# Patient Record
Sex: MALE | Race: WHITE | NOT HISPANIC OR LATINO | Employment: OTHER | URBAN - METROPOLITAN AREA
[De-identification: names, ages, dates, MRNs, and addresses within clinical notes are randomized per-mention and may not be internally consistent; named-entity substitution may affect disease eponyms.]

---

## 2018-01-03 ENCOUNTER — RECORDS - HEALTHEAST (OUTPATIENT)
Dept: LAB | Facility: CLINIC | Age: 78
End: 2018-01-03

## 2018-01-03 LAB
ANION GAP SERPL CALCULATED.3IONS-SCNC: 19 MMOL/L (ref 5–18)
BUN SERPL-MCNC: 15 MG/DL (ref 8–28)
CALCIUM SERPL-MCNC: 9.9 MG/DL (ref 8.5–10.5)
CHLORIDE BLD-SCNC: 104 MMOL/L (ref 98–107)
CHOLEST SERPL-MCNC: 243 MG/DL
CO2 SERPL-SCNC: 17 MMOL/L (ref 22–31)
CREAT SERPL-MCNC: 1.2 MG/DL (ref 0.7–1.3)
FASTING STATUS PATIENT QL REPORTED: ABNORMAL
GFR SERPL CREATININE-BSD FRML MDRD: 58 ML/MIN/1.73M2
GLUCOSE BLD-MCNC: 98 MG/DL (ref 70–125)
HDLC SERPL-MCNC: 47 MG/DL
LDLC SERPL CALC-MCNC: 162 MG/DL
POTASSIUM BLD-SCNC: ABNORMAL MMOL/L (ref 3.5–5)
SODIUM SERPL-SCNC: 140 MMOL/L (ref 136–145)
TRIGL SERPL-MCNC: 169 MG/DL

## 2018-01-22 ENCOUNTER — RECORDS - HEALTHEAST (OUTPATIENT)
Dept: LAB | Facility: CLINIC | Age: 78
End: 2018-01-22

## 2018-01-22 LAB
ERYTHROCYTE [SEDIMENTATION RATE] IN BLOOD BY WESTERGREN METHOD: 8 MM/HR (ref 0–15)
VIT B12 SERPL-MCNC: 388 PG/ML (ref 213–816)

## 2018-01-25 ENCOUNTER — RECORDS - HEALTHEAST (OUTPATIENT)
Dept: ADMINISTRATIVE | Facility: OTHER | Age: 78
End: 2018-01-25

## 2018-01-25 ENCOUNTER — TRANSFERRED RECORDS (OUTPATIENT)
Dept: HEALTH INFORMATION MANAGEMENT | Facility: CLINIC | Age: 78
End: 2018-01-25

## 2018-02-14 ENCOUNTER — TRANSFERRED RECORDS (OUTPATIENT)
Dept: HEALTH INFORMATION MANAGEMENT | Facility: CLINIC | Age: 78
End: 2018-02-14

## 2018-02-14 ENCOUNTER — RECORDS - HEALTHEAST (OUTPATIENT)
Dept: ADMINISTRATIVE | Facility: OTHER | Age: 78
End: 2018-02-14

## 2018-09-20 ENCOUNTER — RECORDS - HEALTHEAST (OUTPATIENT)
Dept: LAB | Facility: CLINIC | Age: 78
End: 2018-09-20

## 2018-09-20 LAB
ANION GAP SERPL CALCULATED.3IONS-SCNC: 10 MMOL/L (ref 5–18)
BUN SERPL-MCNC: 9 MG/DL (ref 8–28)
CALCIUM SERPL-MCNC: 10.2 MG/DL (ref 8.5–10.5)
CHLORIDE BLD-SCNC: 100 MMOL/L (ref 98–107)
CO2 SERPL-SCNC: 28 MMOL/L (ref 22–31)
CREAT SERPL-MCNC: 1.06 MG/DL (ref 0.7–1.3)
GFR SERPL CREATININE-BSD FRML MDRD: >60 ML/MIN/1.73M2
GLUCOSE BLD-MCNC: 107 MG/DL (ref 70–125)
POTASSIUM BLD-SCNC: 4.1 MMOL/L (ref 3.5–5)
SODIUM SERPL-SCNC: 138 MMOL/L (ref 136–145)
VIT B12 SERPL-MCNC: 369 PG/ML (ref 213–816)

## 2018-09-21 ENCOUNTER — RECORDS - HEALTHEAST (OUTPATIENT)
Dept: LAB | Facility: CLINIC | Age: 78
End: 2018-09-21

## 2018-09-21 LAB — TSH SERPL DL<=0.005 MIU/L-ACNC: 1.7 UIU/ML (ref 0.3–5)

## 2018-09-24 LAB — B BURGDOR IGG+IGM SER QL: 0.05 INDEX VALUE

## 2018-10-03 ENCOUNTER — RECORDS - HEALTHEAST (OUTPATIENT)
Dept: ADMINISTRATIVE | Facility: OTHER | Age: 78
End: 2018-10-03

## 2018-10-03 ENCOUNTER — TRANSFERRED RECORDS (OUTPATIENT)
Dept: HEALTH INFORMATION MANAGEMENT | Facility: CLINIC | Age: 78
End: 2018-10-03

## 2018-10-30 LAB — TSH SERPL-ACNC: 1.83 MIU/L (ref 0.4–4.5)

## 2018-11-14 ENCOUNTER — TRANSFERRED RECORDS (OUTPATIENT)
Dept: HEALTH INFORMATION MANAGEMENT | Facility: CLINIC | Age: 78
End: 2018-11-14

## 2018-11-14 ENCOUNTER — RECORDS - HEALTHEAST (OUTPATIENT)
Dept: ADMINISTRATIVE | Facility: OTHER | Age: 78
End: 2018-11-14

## 2019-04-17 ENCOUNTER — RECORDS - HEALTHEAST (OUTPATIENT)
Dept: LAB | Facility: CLINIC | Age: 79
End: 2019-04-17

## 2019-04-17 LAB
ANION GAP SERPL CALCULATED.3IONS-SCNC: 10 MMOL/L (ref 5–18)
BUN SERPL-MCNC: 19 MG/DL (ref 8–28)
CALCIUM SERPL-MCNC: 9.6 MG/DL (ref 8.5–10.5)
CHLORIDE BLD-SCNC: 103 MMOL/L (ref 98–107)
CO2 SERPL-SCNC: 29 MMOL/L (ref 22–31)
CREAT SERPL-MCNC: 1.01 MG/DL (ref 0.7–1.3)
GFR SERPL CREATININE-BSD FRML MDRD: >60 ML/MIN/1.73M2
GLUCOSE BLD-MCNC: 114 MG/DL (ref 70–125)
POTASSIUM BLD-SCNC: 3.9 MMOL/L (ref 3.5–5)
SODIUM SERPL-SCNC: 142 MMOL/L (ref 136–145)

## 2019-07-16 ENCOUNTER — RECORDS - HEALTHEAST (OUTPATIENT)
Dept: LAB | Facility: CLINIC | Age: 79
End: 2019-07-16

## 2019-07-16 LAB
ALBUMIN SERPL-MCNC: 3.8 G/DL (ref 3.5–5)
ALP SERPL-CCNC: 65 U/L (ref 45–120)
ALT SERPL W P-5'-P-CCNC: 16 U/L (ref 0–45)
ANION GAP SERPL CALCULATED.3IONS-SCNC: 14 MMOL/L (ref 5–18)
AST SERPL W P-5'-P-CCNC: 18 U/L (ref 0–40)
BILIRUB SERPL-MCNC: 0.9 MG/DL (ref 0–1)
BUN SERPL-MCNC: 15 MG/DL (ref 8–28)
CALCIUM SERPL-MCNC: 9.7 MG/DL (ref 8.5–10.5)
CHLORIDE BLD-SCNC: 101 MMOL/L (ref 98–107)
CO2 SERPL-SCNC: 25 MMOL/L (ref 22–31)
CREAT SERPL-MCNC: 1.04 MG/DL (ref 0.7–1.3)
ERYTHROCYTE [SEDIMENTATION RATE] IN BLOOD BY WESTERGREN METHOD: 8 MM/HR (ref 0–15)
GFR SERPL CREATININE-BSD FRML MDRD: >60 ML/MIN/1.73M2
GLUCOSE BLD-MCNC: 103 MG/DL (ref 70–125)
POTASSIUM BLD-SCNC: 3.7 MMOL/L (ref 3.5–5)
PROT SERPL-MCNC: 6.6 G/DL (ref 6–8)
PSA SERPL-MCNC: 1.9 NG/ML (ref 0–6.5)
SODIUM SERPL-SCNC: 140 MMOL/L (ref 136–145)

## 2019-07-17 LAB
ALBUMIN PERCENT: 59.7 % (ref 51–67)
ALBUMIN SERPL ELPH-MCNC: 3.8 G/DL (ref 3.2–4.7)
ALPHA 1 PERCENT: 3.1 % (ref 2–4)
ALPHA 2 PERCENT: 10.9 % (ref 5–13)
ALPHA1 GLOB SERPL ELPH-MCNC: 0.2 G/DL (ref 0.1–0.3)
ALPHA2 GLOB SERPL ELPH-MCNC: 0.7 G/DL (ref 0.4–0.9)
B-GLOBULIN SERPL ELPH-MCNC: 0.8 G/DL (ref 0.7–1.2)
BETA PERCENT: 12.7 % (ref 10–17)
GAMMA GLOB SERPL ELPH-MCNC: 0.9 G/DL (ref 0.6–1.4)
GAMMA GLOBULIN PERCENT: 13.6 % (ref 9–20)
PATH ICD:: NORMAL
PROT PATTERN SERPL ELPH-IMP: NORMAL
PROT SERPL-MCNC: 6.4 G/DL (ref 6–8)
REVIEWING PATHOLOGIST: NORMAL

## 2019-07-18 LAB
PATH ICD:: NORMAL
PROT PATTERN SERPL ELPH-IMP: NORMAL
REVIEWING PATHOLOGIST: NORMAL
TOTAL PROTEIN RANDOM URINE MG/DL: 12 MG/DL

## 2019-08-01 ENCOUNTER — RECORDS - HEALTHEAST (OUTPATIENT)
Dept: LAB | Facility: CLINIC | Age: 79
End: 2019-08-01

## 2019-08-01 LAB — TSH SERPL DL<=0.005 MIU/L-ACNC: 1.4 UIU/ML (ref 0.3–5)

## 2019-08-28 ENCOUNTER — RECORDS - HEALTHEAST (OUTPATIENT)
Dept: ADMINISTRATIVE | Facility: OTHER | Age: 79
End: 2019-08-28

## 2019-08-28 ENCOUNTER — AMBULATORY - HEALTHEAST (OUTPATIENT)
Dept: NEUROSURGERY | Facility: CLINIC | Age: 79
End: 2019-08-28

## 2019-08-28 ENCOUNTER — RECORDS - HEALTHEAST (OUTPATIENT)
Dept: RADIOLOGY | Facility: CLINIC | Age: 79
End: 2019-08-28

## 2019-09-03 ENCOUNTER — OFFICE VISIT - HEALTHEAST (OUTPATIENT)
Dept: NEUROSURGERY | Facility: CLINIC | Age: 79
End: 2019-09-03

## 2019-09-03 DIAGNOSIS — M54.16 LUMBAR RADICULOPATHY: ICD-10-CM

## 2019-09-03 DIAGNOSIS — G89.29 CHRONIC RIGHT SHOULDER PAIN: ICD-10-CM

## 2019-09-03 DIAGNOSIS — M25.511 CHRONIC RIGHT SHOULDER PAIN: ICD-10-CM

## 2019-09-10 ENCOUNTER — RECORDS - HEALTHEAST (OUTPATIENT)
Dept: ADMINISTRATIVE | Facility: OTHER | Age: 79
End: 2019-09-10

## 2019-09-12 ENCOUNTER — COMMUNICATION - HEALTHEAST (OUTPATIENT)
Dept: NEUROSURGERY | Facility: CLINIC | Age: 79
End: 2019-09-12

## 2019-09-12 ENCOUNTER — AMBULATORY - HEALTHEAST (OUTPATIENT)
Dept: NEUROSURGERY | Facility: CLINIC | Age: 79
End: 2019-09-12

## 2019-09-12 DIAGNOSIS — Z01.818 PRE-OP EXAM: ICD-10-CM

## 2019-09-19 ASSESSMENT — MIFFLIN-ST. JEOR: SCORE: 1819.83

## 2019-09-20 ENCOUNTER — COMMUNICATION - HEALTHEAST (OUTPATIENT)
Dept: NEUROSURGERY | Facility: CLINIC | Age: 79
End: 2019-09-20

## 2019-09-23 ENCOUNTER — SURGERY - HEALTHEAST (OUTPATIENT)
Dept: SURGERY | Facility: HOSPITAL | Age: 79
End: 2019-09-23

## 2019-09-23 ENCOUNTER — AMBULATORY - HEALTHEAST (OUTPATIENT)
Dept: NEUROSURGERY | Facility: CLINIC | Age: 79
End: 2019-09-23

## 2019-09-23 ENCOUNTER — ANESTHESIA - HEALTHEAST (OUTPATIENT)
Dept: SURGERY | Facility: HOSPITAL | Age: 79
End: 2019-09-23

## 2019-09-24 ENCOUNTER — COMMUNICATION - HEALTHEAST (OUTPATIENT)
Dept: NEUROSURGERY | Facility: CLINIC | Age: 79
End: 2019-09-24

## 2021-01-01 ENCOUNTER — HOSPITAL ENCOUNTER (EMERGENCY)
Facility: HOSPITAL | Age: 81
Discharge: HOME | End: 2021-12-16
Attending: EMERGENCY MEDICINE | Admitting: EMERGENCY MEDICINE
Payer: MEDICARE

## 2021-01-01 ENCOUNTER — TELEPHONE (OUTPATIENT)
Dept: NEUROLOGY | Facility: CLINIC | Age: 81
End: 2021-01-01

## 2021-01-01 ENCOUNTER — HEALTH MAINTENANCE LETTER (OUTPATIENT)
Age: 81
End: 2021-01-01

## 2021-01-01 ENCOUNTER — VIRTUAL VISIT (OUTPATIENT)
Dept: PHARMACY | Facility: CLINIC | Age: 81
End: 2021-01-01
Payer: COMMERCIAL

## 2021-01-01 VITALS
HEIGHT: 73 IN | DIASTOLIC BLOOD PRESSURE: 79 MMHG | RESPIRATION RATE: 14 BRPM | WEIGHT: 190 LBS | BODY MASS INDEX: 25.18 KG/M2 | SYSTOLIC BLOOD PRESSURE: 157 MMHG | HEART RATE: 78 BPM | OXYGEN SATURATION: 95 % | TEMPERATURE: 98.3 F

## 2021-01-01 DIAGNOSIS — R33.9 URINARY RETENTION: ICD-10-CM

## 2021-01-01 DIAGNOSIS — G20.A1 PARKINSON DISEASE (H): Primary | ICD-10-CM

## 2021-01-01 DIAGNOSIS — N40.1 LOWER URINARY TRACT SYMPTOMS DUE TO BENIGN PROSTATIC HYPERPLASIA: ICD-10-CM

## 2021-01-01 DIAGNOSIS — I95.1 ORTHOSTATIC HYPOTENSION: ICD-10-CM

## 2021-01-01 LAB
ALBUMIN UR-MCNC: NEGATIVE MG/DL
ANION GAP SERPL CALCULATED.3IONS-SCNC: 10 MMOL/L (ref 5–18)
APPEARANCE UR: CLEAR
BILIRUB UR QL STRIP: NEGATIVE
BUN SERPL-MCNC: 16 MG/DL (ref 8–28)
CALCIUM SERPL-MCNC: 9.2 MG/DL (ref 8.5–10.5)
CHLORIDE BLD-SCNC: 104 MMOL/L (ref 98–107)
CO2 SERPL-SCNC: 27 MMOL/L (ref 22–31)
COLOR UR AUTO: ABNORMAL
CREAT SERPL-MCNC: 1.07 MG/DL (ref 0.7–1.3)
ERYTHROCYTE [DISTWIDTH] IN BLOOD BY AUTOMATED COUNT: 12.3 % (ref 10–15)
GFR SERPL CREATININE-BSD FRML MDRD: 64 ML/MIN/1.73M2
GLUCOSE BLD-MCNC: 117 MG/DL (ref 70–125)
GLUCOSE UR STRIP-MCNC: NEGATIVE MG/DL
HCT VFR BLD AUTO: 38.2 % (ref 40–53)
HGB BLD-MCNC: 12.3 G/DL (ref 13.3–17.7)
HGB UR QL STRIP: ABNORMAL
KETONES UR STRIP-MCNC: NEGATIVE MG/DL
LEUKOCYTE ESTERASE UR QL STRIP: NEGATIVE
MCH RBC QN AUTO: 30.7 PG (ref 26.5–33)
MCHC RBC AUTO-ENTMCNC: 32.2 G/DL (ref 31.5–36.5)
MCV RBC AUTO: 95 FL (ref 78–100)
NITRATE UR QL: NEGATIVE
PH UR STRIP: 6.5 [PH] (ref 5–7)
PLATELET # BLD AUTO: 206 10E3/UL (ref 150–450)
POTASSIUM BLD-SCNC: 3.6 MMOL/L (ref 3.5–5)
RBC # BLD AUTO: 4.01 10E6/UL (ref 4.4–5.9)
RBC URINE: 3 /HPF
SODIUM SERPL-SCNC: 141 MMOL/L (ref 136–145)
SP GR UR STRIP: 1.01 (ref 1–1.03)
UROBILINOGEN UR STRIP-MCNC: <2 MG/DL
WBC # BLD AUTO: 19.3 10E3/UL (ref 4–11)
WBC URINE: 1 /HPF

## 2021-01-01 PROCEDURE — 36415 COLL VENOUS BLD VENIPUNCTURE: CPT | Performed by: EMERGENCY MEDICINE

## 2021-01-01 PROCEDURE — 81001 URINALYSIS AUTO W/SCOPE: CPT | Performed by: EMERGENCY MEDICINE

## 2021-01-01 PROCEDURE — 51702 INSERT TEMP BLADDER CATH: CPT

## 2021-01-01 PROCEDURE — 51798 US URINE CAPACITY MEASURE: CPT

## 2021-01-01 PROCEDURE — 80048 BASIC METABOLIC PNL TOTAL CA: CPT | Performed by: EMERGENCY MEDICINE

## 2021-01-01 PROCEDURE — 99606 MTMS BY PHARM EST 15 MIN: CPT | Performed by: PHARMACIST

## 2021-01-01 PROCEDURE — 85027 COMPLETE CBC AUTOMATED: CPT | Performed by: EMERGENCY MEDICINE

## 2021-01-01 PROCEDURE — 99284 EMERGENCY DEPT VISIT MOD MDM: CPT | Mod: 25

## 2021-01-01 ASSESSMENT — ACTIVITIES OF DAILY LIVING (ADL)
DEPENDENT_IADLS:: CLEANING;COOKING;LAUNDRY;SHOPPING;MEAL PREPARATION;MEDICATION MANAGEMENT;MONEY MANAGEMENT;TRANSPORTATION;INCONTINENCE

## 2021-01-01 ASSESSMENT — MIFFLIN-ST. JEOR: SCORE: 1610.71

## 2021-02-19 ENCOUNTER — IMMUNIZATION (OUTPATIENT)
Dept: NURSING | Facility: CLINIC | Age: 81
End: 2021-02-19
Payer: MEDICARE

## 2021-02-19 PROCEDURE — 0001A PR COVID VAC PFIZER DIL RECON 30 MCG/0.3 ML IM: CPT

## 2021-02-19 PROCEDURE — 91300 PR COVID VAC PFIZER DIL RECON 30 MCG/0.3 ML IM: CPT

## 2021-03-12 ENCOUNTER — IMMUNIZATION (OUTPATIENT)
Dept: NURSING | Facility: CLINIC | Age: 81
End: 2021-03-12
Attending: INTERNAL MEDICINE
Payer: MEDICARE

## 2021-03-12 PROCEDURE — 91300 PR COVID VAC PFIZER DIL RECON 30 MCG/0.3 ML IM: CPT

## 2021-03-12 PROCEDURE — 0002A PR COVID VAC PFIZER DIL RECON 30 MCG/0.3 ML IM: CPT

## 2021-04-03 ENCOUNTER — HEALTH MAINTENANCE LETTER (OUTPATIENT)
Age: 81
End: 2021-04-03

## 2021-04-26 ENCOUNTER — TRANSFERRED RECORDS (OUTPATIENT)
Dept: HEALTH INFORMATION MANAGEMENT | Facility: CLINIC | Age: 81
End: 2021-04-26

## 2021-04-26 ENCOUNTER — RECORDS - HEALTHEAST (OUTPATIENT)
Dept: LAB | Facility: CLINIC | Age: 81
End: 2021-04-26

## 2021-04-26 LAB
ANION GAP SERPL CALCULATED.3IONS-SCNC: 12 MMOL/L (ref 5–18)
BUN SERPL-MCNC: 16 MG/DL (ref 8–28)
CALCIUM SERPL-MCNC: 9.1 MG/DL (ref 8.5–10.5)
CHLORIDE BLD-SCNC: 98 MMOL/L (ref 98–107)
CHOLEST SERPL-MCNC: 136 MG/DL
CO2 SERPL-SCNC: 26 MMOL/L (ref 22–31)
CREAT SERPL-MCNC: 1.19 MG/DL (ref 0.7–1.3)
FASTING STATUS PATIENT QL REPORTED: ABNORMAL
FOLATE SERPL-MCNC: 17.6 NG/ML
GFR SERPL CREATININE-BSD FRML MDRD: 59 ML/MIN/1.73M2
GLUCOSE BLD-MCNC: 97 MG/DL (ref 70–125)
HDLC SERPL-MCNC: 36 MG/DL
LDLC SERPL CALC-MCNC: 81 MG/DL
MAGNESIUM SERPL-MCNC: 1.7 MG/DL (ref 1.8–2.6)
POTASSIUM BLD-SCNC: 4.1 MMOL/L (ref 3.5–5)
SODIUM SERPL-SCNC: 136 MMOL/L (ref 136–145)
TRIGL SERPL-MCNC: 95 MG/DL
VIT B12 SERPL-MCNC: >2000 PG/ML (ref 213–816)

## 2021-04-28 ENCOUNTER — MEDICAL CORRESPONDENCE (OUTPATIENT)
Dept: HEALTH INFORMATION MANAGEMENT | Facility: CLINIC | Age: 81
End: 2021-04-28

## 2021-04-29 ENCOUNTER — TRANSCRIBE ORDERS (OUTPATIENT)
Dept: OTHER | Age: 81
End: 2021-04-29

## 2021-04-29 DIAGNOSIS — G25.9 MOVEMENT DISORDER: Primary | ICD-10-CM

## 2021-04-30 LAB — VIT B1 PYROPHOSHATE BLD-SCNC: 145 NMOL/L (ref 70–180)

## 2021-05-04 NOTE — TELEPHONE ENCOUNTER
RECORDS RECEIVED FROM: External   Date of Appt: 6/10/21   NOTES (FOR ALL VISITS) STATUS DETAILS   OFFICE NOTE from referring provider Received Dr Miguel Varner @ [facility]:  4/26/21 7/16/19   OFFICE NOTE from other specialist Received Dr Amelia Leal @ [facility]:  10/3/18   DISCHARGE SUMMARY from hospital N/A    DISCHARGE REPORT from the ER N/A    OPERATIVE REPORT N/A    MEDICATION LIST Received    IMAGING  (FOR ALL VISITS)     EMG Received [facility]:  11/14/18    [facility]:  1/25/18   EEG N/A    LUMBAR PUNCTURE N/A    SHRUTHI SCAN N/A    ULTRASOUND (CAROTID BILAT) *VASCULAR* N/A    MRI (HEAD, NECK, SPINE) Received [facility]:  MRI Brain 2/13/18   CT (HEAD, NECK, SPINE) N/A       Action 5/4/21 MV 8.24am   Action Taken Records and imaging request faxed to [facility]     Action 5/5/21 MV 8.14am   Action Taken Records received from [facility] via fax. Sent to scanning.    Imaging request faxed to [facility]:  MRI Brain 2/13/18     Action 5/5/21 MV 8.44am   Action Taken Imaging report received from [facility] and sent to scanning. Images resolved in PACS

## 2021-05-26 VITALS — OXYGEN SATURATION: 94 % | HEART RATE: 63 BPM | SYSTOLIC BLOOD PRESSURE: 189 MMHG | DIASTOLIC BLOOD PRESSURE: 94 MMHG

## 2021-05-28 PROBLEM — E55.9 VITAMIN D DEFICIENCY: Status: ACTIVE | Noted: 2021-05-28

## 2021-05-28 PROBLEM — N40.1 LOWER URINARY TRACT SYMPTOMS DUE TO BENIGN PROSTATIC HYPERPLASIA: Status: ACTIVE | Noted: 2021-05-28

## 2021-05-28 PROBLEM — M53.2X7 INSTABILITY OF LUMBOSACRAL JOINT: Status: ACTIVE | Noted: 2021-05-28

## 2021-05-28 PROBLEM — G56.02 CARPAL TUNNEL SYNDROME OF LEFT WRIST: Status: ACTIVE | Noted: 2021-05-28

## 2021-05-28 PROBLEM — G57.93 NEUROPATHY INVOLVING BOTH LOWER EXTREMITIES: Status: ACTIVE | Noted: 2021-05-28

## 2021-05-28 PROBLEM — G62.9 SENSORY NEUROPATHY: Status: ACTIVE | Noted: 2021-05-28

## 2021-05-28 PROBLEM — M54.30 SCIATICA: Status: ACTIVE | Noted: 2021-05-28

## 2021-05-28 PROBLEM — G20.A1 PARKINSON DISEASE (H): Status: ACTIVE | Noted: 2021-05-28

## 2021-05-28 PROBLEM — K21.9 GASTROESOPHAGEAL REFLUX DISEASE: Status: ACTIVE | Noted: 2021-05-28

## 2021-05-28 PROBLEM — G89.4 CHRONIC PAIN DISORDER: Status: ACTIVE | Noted: 2021-05-28

## 2021-05-28 PROBLEM — R53.1 WEAKNESS: Status: ACTIVE | Noted: 2021-05-28

## 2021-05-28 PROBLEM — G25.9 MOVEMENT DISORDER: Status: ACTIVE | Noted: 2021-05-28

## 2021-05-28 PROBLEM — R20.9 SKIN SENSATION DISTURBANCE: Status: ACTIVE | Noted: 2021-05-28

## 2021-05-28 PROBLEM — E78.5 HYPERLIPIDEMIA: Status: ACTIVE | Noted: 2021-05-28

## 2021-05-28 PROBLEM — Z79.899 OTHER LONG TERM (CURRENT) DRUG THERAPY: Status: ACTIVE | Noted: 2021-05-28

## 2021-05-28 PROBLEM — I10 ESSENTIAL HYPERTENSION: Status: ACTIVE | Noted: 2021-05-28

## 2021-05-28 PROBLEM — R26.9 ABNORMAL GAIT: Status: ACTIVE | Noted: 2021-05-28

## 2021-05-28 RX ORDER — HYDROCHLOROTHIAZIDE 25 MG/1
TABLET ORAL
COMMUNITY
Start: 2021-02-23 | End: 2021-06-10

## 2021-05-28 RX ORDER — GABAPENTIN 100 MG/1
CAPSULE ORAL
COMMUNITY
Start: 2021-04-26 | End: 2021-06-02

## 2021-05-28 RX ORDER — UBIDECARENONE 75 MG
100 CAPSULE ORAL DAILY
COMMUNITY
End: 2021-06-10

## 2021-05-28 RX ORDER — LOSARTAN POTASSIUM AND HYDROCHLOROTHIAZIDE 25; 100 MG/1; MG/1
TABLET ORAL
COMMUNITY
End: 2021-06-10

## 2021-05-28 RX ORDER — CHLORAL HYDRATE 500 MG
CAPSULE ORAL
COMMUNITY
End: 2021-06-02

## 2021-05-28 RX ORDER — LOSARTAN POTASSIUM AND HYDROCHLOROTHIAZIDE 25; 100 MG/1; MG/1
1 TABLET ORAL DAILY
COMMUNITY
Start: 2019-06-25 | End: 2021-06-10

## 2021-05-28 RX ORDER — METOPROLOL TARTRATE 50 MG
50 TABLET ORAL 2 TIMES DAILY
COMMUNITY
Start: 2019-06-25 | End: 2021-06-10

## 2021-05-28 RX ORDER — LOSARTAN POTASSIUM 100 MG/1
TABLET ORAL
COMMUNITY
Start: 2021-02-23 | End: 2021-06-10

## 2021-05-28 RX ORDER — METOPROLOL TARTRATE 50 MG
TABLET ORAL
COMMUNITY
End: 2021-06-10

## 2021-05-28 RX ORDER — DOCUSATE SODIUM 100 MG/1
CAPSULE, LIQUID FILLED ORAL
COMMUNITY
Start: 2020-11-04 | End: 2021-06-02

## 2021-05-28 RX ORDER — HYDROCORTISONE AND ACETIC ACID 20.75; 10.375 MG/ML; MG/ML
SOLUTION AURICULAR (OTIC)
COMMUNITY
End: 2021-06-02

## 2021-05-28 RX ORDER — TAMSULOSIN HYDROCHLORIDE 0.4 MG/1
0.4 CAPSULE ORAL DAILY
COMMUNITY
Start: 2019-09-10 | End: 2021-01-01
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
## 2021-05-31 NOTE — PATIENT INSTRUCTIONS - HE
Complete an EMG  Left Carpal Tunnel surgery    PROCEDURE PLANNING:    Complete information about approaching surgery was provided.      Discussed discharge planning and expected outcomes after surgery as well as follow-ups and restrictions.      Emphasized on stop taking ASA, NSAID's, vitamins and /or OTC herbal supplements within 10 days and any anticoagulant meds within 7 days prior to surgery.      Reminded patient to bring all pertinent films to hospital the day of surgery.    NPO after midnight    Pamphlet about surgery provided.    Answered all questions.    The  will call patient with all pre-op orders and instructions.      Patient to complete all required diagnostic tests prior to surgery.    If test are not completed this will cancel the surgery; contact the clinic nurses at [phone] if unable to complete test.        Pre-operative Skin Prep:  The purpose of the following instructions for pre-operative skin cleaning is to decrease the risk of a post-operative wound infection.  **Please review the directions on the bottle of antiseptic soap and LARA wipes before using.     1. The evening before your scheduled surgery take a shower using the bottle of antiseptic soap provided.    2. Squirt the soap on a washcloth and use to wash.  Pay special attention to your planned operative site and armpits.  Rinse thoroughly.  DO NOT use the antiseptic soap on your face or hair!!!!      DO NOT GET IN YOUR EYES!!!!!  3. After you have completed your shower, and your skin is completely dry use the LARA wipes to clean the planned operative site.  Refer to the diagram provided to you by our office nurse during your pre-op visit to locate the area to clean.  If your operative site is on your back, you may need someone to assist you.   DO NOT use the antiseptic wipe on your face or hair!!!!   DO NOT GET IN YOUR EYES!!!!!  Wash your hands after use.  4. DO NOT shower or wipe the skin after cleaning the skin with  the antiseptic wipe.  Your skin may feel sticky.   **If you have burning, itching or develop a rash take a shower to remove the antiseptic solution.      * IF Hibiclens shower NOT DONE evening prior to surgery, then scrub surgical site for 10 minutes with Hibiclens, dry thoroughly.

## 2021-05-31 NOTE — PROGRESS NOTES
Jacobo Sepulveda presents today for a consult regarding cervical area. He was referred by Dr. Ayala with [facility].   Vital signs were taken, no consult info asked per Dr. Mason as patient was late to appointment.  Melissa Nobles LPN
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
## 2021-06-01 NOTE — TELEPHONE ENCOUNTER
ORDER FROM: Dr. Mason    PRE AUTHORIZATION: No PA needed. Ok to schedule.    METHOD OF PATIENT CONTACT: Spoke to Jacobo on the phone. Best number to reach: [phone].    PROCEDURE: Left carpal tunnel release.    SURGICAL DATE: 19 @ 10:45 AM ([facility])    READINESS VISIT: Please Call    PCP, CLINIC, PHONE #: Dr. Varner, [facility], [phone].    PRE-OP PHYSICAL: 19 @ 10:00 AM with Sully Michael NP    FILM INFO: MRI CERVICAL: 19 @ [facility] (PUSHED TO NIL)          EM18 @ [facility]    SURGICAL LETTER: Mailed to patient on 19

## 2021-06-01 NOTE — TELEPHONE ENCOUNTER
Called patient, discussed surgery, post-op course, expectations, follow up plan.    Reviewed H&P from 9/17/2019 - cleared for surgery  Labs, ECHO - WNL (coags were not done, pt refused to have them done prior to surgery)    To OR as planned.     Check in - 0830    Nothing to eat or drink after midnight the night before surgery.     Continue to refrain from NSAIDS (Ibuprofen, Aleve, Naprosyn), ASA, Over the counter herbal medications or supplements, anti-coagulants and blood thinners.     Patient confirmed they have help/assistance in place at home upon discharge    Answered all questions to patient's satisfaction.    Tammie Mann, RN, CNRN

## 2021-06-01 NOTE — TELEPHONE ENCOUNTER
PATIENT NAME:  Jacobo Sepulveda  YOB: 1940  MRN: [id number]  SURGEON: DR. PORTILLO  DATE of SURGERY: 9-23-19  PROCEDURE: LEFT CARPAL TUNNEL RELEASE    FOLLOW-UP:    Sutures Out : 14 Days [On nurse schedule unless noted otherwise]  Post Op Visit: 6+ weeks   Post-op Provider: NP  DIAGNOSTICS:  NONE  DISPOSITION:  HOME AFTER SURGERY    ADDITIONAL INSTRUCTIONS FOR MEDICAL STAFF:

## 2021-06-01 NOTE — ANESTHESIA CARE TRANSFER NOTE
Last vitals:   Vitals:    09/23/19 1149   BP: 141/69   Pulse: 63   Resp: 16   Temp: 36.7  C (98.1  F)   SpO2: 95%     Patient's level of consciousness is drowsy  Spontaneous respirations: yes  Maintains airway independently: yes  Dentition unchanged: yes  Oropharynx: oropharynx clear of all foreign objects    QCDR Measures:  ASA# 20 - Surgical Safety Checklist: WHO surgical safety checklist completed prior to induction    PQRS# 430 - Adult PONV Prevention: 4558F - Pt received => 2 anti-emetic agents (different classes) preop & intraop  ASA# 8 - Peds PONV Prevention: NA - Not pediatric patient, not GA or 2 or more risk factors NOT present  PQRS# 424 - Cata-op Temp Management: 4559F - At least one body temp DOCUMENTED => 35.5C or 95.9F within required timeframe  PQRS# 426 - PACU Transfer Protocol: - Transfer of care checklist used  ASA# 14 - Acute Post-op Pain: ASA14B - Patient did NOT experience pain >= 7 out of 10

## 2021-06-01 NOTE — ANESTHESIA POSTPROCEDURE EVALUATION
Patient: Jacobo Sepulveda  LEFT CARPAL TUNNEL RELEASE  Anesthesia type: MAC    Patient location: PACU  Last vitals:   Vitals Value Taken Time   /84 9/23/2019  1:00 PM   Temp 36.7  C (98.1  F) 9/23/2019 11:49 AM   Pulse 70 9/23/2019  1:09 PM   Resp 16 9/23/2019 12:54 PM   SpO2 92 % 9/23/2019  1:09 PM   Vitals shown include unvalidated device data.  Post vital signs: stable  Level of consciousness: awake and responds to simple questions  Post-anesthesia pain: pain controlled  Post-anesthesia nausea and vomiting: no  Pulmonary: unassisted, return to baseline  Cardiovascular: stable and blood pressure at baseline  Hydration: adequate  Anesthetic events: no    QCDR Measures:  ASA# 11 - Cata-op Cardiac Arrest: ASA11B - Patient did NOT experience unanticipated cardiac arrest  ASA# 12 - Cata-op Mortality Rate: ASA12B - Patient did NOT die  ASA# 13 - PACU Re-Intubation Rate: ASA13B - Patient did NOT require a new airway mgmt  ASA# 10 - Composite Anes Safety: ASA10A - No serious adverse event    Additional Notes:

## 2021-06-01 NOTE — TELEPHONE ENCOUNTER
Per Dr. Mason,  called and let Bill know that he does not appear to meet medical necessity for the procedure Interlaminar Epidural Steroid Injection Lumbar  per payor guidelines. He will follow up at Spine clinic.  Tammie Mann RN, CNRN

## 2021-06-02 RX ORDER — HYDROCORTISONE AND ACETIC ACID 20.75; 10.375 MG/ML; MG/ML
SOLUTION AURICULAR (OTIC)
COMMUNITY
Start: 2021-06-02 | End: 2021-06-10

## 2021-06-02 RX ORDER — GABAPENTIN 100 MG/1
CAPSULE ORAL
COMMUNITY
Start: 2021-06-02 | End: 2021-06-10

## 2021-06-02 RX ORDER — CHLORAL HYDRATE 500 MG
CAPSULE ORAL
COMMUNITY
Start: 2021-06-02 | End: 2021-06-10

## 2021-06-02 RX ORDER — DOCUSATE SODIUM 100 MG/1
CAPSULE, LIQUID FILLED ORAL
COMMUNITY
Start: 2021-06-02 | End: 2021-06-10
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
## 2021-06-03 VITALS — HEIGHT: 75 IN | BODY MASS INDEX: 28.35 KG/M2 | WEIGHT: 228 LBS

## 2021-06-10 ENCOUNTER — OFFICE VISIT (OUTPATIENT)
Dept: NEUROLOGY | Facility: CLINIC | Age: 81
End: 2021-06-10
Attending: FAMILY MEDICINE
Payer: MEDICARE

## 2021-06-10 ENCOUNTER — PRE VISIT (OUTPATIENT)
Dept: NEUROLOGY | Facility: CLINIC | Age: 81
End: 2021-06-10

## 2021-06-10 VITALS
DIASTOLIC BLOOD PRESSURE: 76 MMHG | HEIGHT: 74 IN | BODY MASS INDEX: 29.67 KG/M2 | RESPIRATION RATE: 16 BRPM | SYSTOLIC BLOOD PRESSURE: 150 MMHG | OXYGEN SATURATION: 97 % | HEART RATE: 74 BPM

## 2021-06-10 DIAGNOSIS — M24.562 CONTRACTURES OF BOTH KNEES: ICD-10-CM

## 2021-06-10 DIAGNOSIS — R06.83 SNORES: Primary | ICD-10-CM

## 2021-06-10 DIAGNOSIS — G62.9 SENSORY NEUROPATHY: ICD-10-CM

## 2021-06-10 DIAGNOSIS — I95.1 ORTHOSTATIC HYPOTENSION: ICD-10-CM

## 2021-06-10 DIAGNOSIS — G47.30 SLEEP APNEA, UNSPECIFIED TYPE: ICD-10-CM

## 2021-06-10 DIAGNOSIS — G20.C PARKINSONISM, UNSPECIFIED PARKINSONISM TYPE (H): ICD-10-CM

## 2021-06-10 DIAGNOSIS — M24.561 CONTRACTURES OF BOTH KNEES: ICD-10-CM

## 2021-06-10 DIAGNOSIS — Z99.3 WHEELCHAIR BOUND: ICD-10-CM

## 2021-06-10 PROCEDURE — 99205 OFFICE O/P NEW HI 60 MIN: CPT | Performed by: PSYCHIATRY & NEUROLOGY

## 2021-06-10 RX ORDER — HYDROCORTISONE AND ACETIC ACID 20.75; 10.375 MG/ML; MG/ML
SOLUTION AURICULAR (OTIC)
COMMUNITY
Start: 2021-06-10 | End: 2021-06-22

## 2021-06-10 RX ORDER — UBIDECARENONE 75 MG
100 CAPSULE ORAL EVERY OTHER DAY
COMMUNITY
Start: 2021-06-10

## 2021-06-10 RX ORDER — DOCUSATE SODIUM 100 MG/1
CAPSULE, LIQUID FILLED ORAL
COMMUNITY
Start: 2021-06-10

## 2021-06-10 RX ORDER — POLYETHYLENE GLYCOL 3350 17 G/17G
1 POWDER, FOR SOLUTION ORAL DAILY PRN
COMMUNITY

## 2021-06-10 RX ORDER — CHLORAL HYDRATE 500 MG
CAPSULE ORAL
COMMUNITY
Start: 2021-06-10

## 2021-06-10 ASSESSMENT — PAIN SCALES - GENERAL: PAINLEVEL: SEVERE PAIN (7)
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
## 2021-06-10 NOTE — NURSING NOTE
Chief Complaint   Patient presents with     Consult For     Consult     [facility] New Movement Disorder     Kimo Colvin

## 2021-06-10 NOTE — PATIENT INSTRUCTIONS
Assessment:    (G20) Parkinsonism, unspecified Parkinsonism type (H)  (primary encounter diagnosis)  1st neurologist said he had parkinson - seen only once   Had been tried on a medication and it knocked him out and tried it again about a week later and had palpitations and stopped it - heart was beating out of his chest.   States that this was sinemet.   He has not been on other parkinsonian medications    2nd neurologist     No family history of parkinson or amy    Worked at [employer] - , , etc.   Retired at 51 years of age  Has not worked since for anyone else    [employer]   Working today     Gabapentin neurontin 100mg     Gait/Balance/Falls   Using 2.5 years   He has been falling  His problems began at age 67 years of age developed sensation in his feet  And had problems with [place] at age 73 years  Fell forward.   He had unexplained falls  He would look down at his hands he would lose his balance and fall and sometimes catch himself.   Then has had progressive symptoms since that time.     He has been to Dr. Varner from Dr. Isabel from [facility]     Has had injuries from [place] while serving in the [employer].     Brain MRI was done in [facility]  ?reduced white matter findings.     Fall in past - ladder came down on top of him - he had regression.     He has seen 2 physio [facility]    Tried medical marijuana dr shahid in [facility]    Cannot walk with a walker anymore   Had used it for 3.5 years  Moved to a scooter and wheelchair for distances.     He has had EMG studies in the past -   States he has had  Neuropathy -     He has had spinal imaging - no images available for review    Had brain mri with  Nonspecific findings on review. By me     Has numbness down his legs    He has been numb since age 67 years.     Loss of sense of smell -     Exercise/Therapy--    Carpal tunnel surgery - had some stuff oozed out.     Left hand swelling      Right handed    Left side is affected - but affected due to his shoulder   Probably began on the left side with tremor.     Handwriting is affected but has problems     Cognitive/Driving - not driving  States he is not have significant mood problems    Mood   No significant depression  Has some anxiety    Hallucinations/delusions   denies    Sleep   He can fall asleep in a chair  He goes to bed around 2am   He snores  He may talk in his sleep  He has some dreams.   He has not sleep study  His wife is sleeping in a lift chair  His wife is in another room.     Bladder   BPH  Tamsulosin flomax 0.4mg - daily - was helping somewhat and was taking twice daily   He has significant urinary frequency - 12 to 14 times per night  He is wearing diapers  He has so much mobility issues he was thinking  About cutting about the flomax    GI/Constipation/GERD   GERD  Docusate sodium colace 100mg daily  Still having a bowel movement sometimes several days where he has bowel movements and then does not have bowel movements for a few days  He uses miralax as needed    ENDO   Vitamin D deficiency  Cholecalciferol Vitamin D3 25mcg 1000 units daily  Fish oil-omega 3 fatty acids 1000mg  5/day     Cardio/heart   Hypertension  But has had low blood pressure readings while on bp medications.   He stopped all his blood pressure medications.   Hydrochlorothiazide hydrodiuril 25mg  - not taking  Losartan cozaar 100mg - not taking   Losartan-hydrochlorothiazide hyzaar 100-25mg - not taking   Metoprolol tartrate loressor 50mg -not taking     He has had some faints  When his neck is flexed he has some problems breathing  When he passes out he would burp a lot  This year he may just pass out and turns white.     Sitting at the computer.      Thinks some of his problems are related to his neck position in terms of his passing out.     Vision       Heme   Aspirin 81mg  Cyanocobalamin Vitamin B12 100mcg    Other:    Carpal tunnel syndrome left  wrist  Sensory neuropathy  Righ sciatic  Medical cannabis use    Acetic acid-hydrocortisone acetasol HC 1-2% otic solution        Medications            Acetic acid-hydrocortisone acetasol HC 1-2% otic solution As needed       Aspirin 81mg 1       Cholecalciferol Vitamin D3 25mcg 1000 units  1  1     Cyanocobalamin Vitamin B12 100mcg Every other day       Docusate sodium colace 100mg 1       Fish oil-omega 3 fatty acids 1000mg 5/day       Gabapentin neurontin 100mg  Not taking       Hydrochlorothiazide hydrodiuril 25mg  Not taking        Losartan cozaar 100mg  Not taking       Losartan-hydrochlorothiazide hyzaar 100-25mg Not taking       Metoprolol tartrate loressor 50mg  Not taking       Tamsulosin flomax 0.4mg  1 - may wean off                                           Plan:    Not interested in another brain mri scan or back scans  Has had scans done but not available.     Not sure about additional testing    He has a neuropathy but cannot find report  b12 and tsh were normal in the past.     He would benefit from a cardiology consultation    He would benefit from neurology pharmacy input - possible trial of amantadine 100mg 2/day    Benefit from PMR consultation.     Not sure as to his spine status.     Proxy access granted to his son    Return back in 3 months.

## 2021-06-17 ENCOUNTER — TELEPHONE (OUTPATIENT)
Dept: CARDIOLOGY | Facility: CLINIC | Age: 81
End: 2021-06-17

## 2021-06-17 NOTE — TELEPHONE ENCOUNTER
"Pt needs to schedule an appointment with Dr. Figueroa per a referral.    CARDIOLOGY EVAL ADULT REFERRAL HAS BEEN CANCELLED; can be found in the \"finalized requests\" tab of the pt's appointment desk. PLEASE REINSTATE (right click on request and select \"reinstate\") AND LINK TO APPOINTMENT WHEN SCHEDULING.     "

## 2021-06-19 NOTE — LETTER
Letter by Jenny Mason MD at      Author: Jenny Mason MD Service: -- Author Type: --    Filed:  Encounter Date: 9/12/2019 Status: (Other)       Dear Mr. Sepulveda,    This letter will help in preparation for your upcoming surgery. Please contact us with any additional questions you may have regarding your surgery. Contact information for your surgery scheduler:   Esteban Nicole, and Rosanne: [phone] ~ Vivienne Simon and Ollie: [phone] ~ Stephen    You are scheduled for: Left Carpal Tunnel Release  With: Dr. Jenny Mason  Date/Time: Monday, September 23, 2019 at 10:45 am (time subject to change)  Location: [place] [address]    Check in at the Welcome Desk inside the main doors of the hospital. An escort will take you to the surgery waiting area. A nurse from [facility] (surgery admit unit) at the hospital will call you with your exact arrival time to the hospital - typically one-and-a-half to two-and-a-half hours prior to your scheduled surgery time.     In the event of an emergency surgery case, there may be an adjustment to your start time for surgery.     PREPARING FOR YOUR SURGERY    *Pre-op Physical: Tuesday, September 17, 2019 at 10:00 am with Sully Michael NP at the [facility]. Please have your LABS completed at this appointment as well. Orders have been placed with your primary care provider.     *Please discuss the necessity of receiving a pneumococcal vaccine prior to surgery at your pre-op physical. Recommended for all patients over the age of 65, or based on certain medical conditions.     *After the pre-op physical is complete, please have your clinic fax the visit note to your surgery scheduler at [phone].    *Pre-op Lab Work: Tuesday, Sept. 17 at your Pre-Op physical appointment. I have faxed the labs to your clinic.     *Readiness Visit: Dr. Mason's nurse will call you prior to your  procedure to go over your Pre-op physical and lab work, give you Pre-surgery instructions, and answer any additional questions you may have.     *Bring your images on disk or film to your Readiness Visit so we can have them loaded into our system and available for your surgery, if they have not previously been brought to our office. Failure to bring your images to our office will result in cancellation of your surgery.     *Ensure that you have completed your pre-op physical, along with any other necessary tests/appointments (listed above), prior to your Readiness Visit.         ADDITIONAL INFORMATION REGARDING YOUR SURGERY    Medications    Bring a list ALL of your medications, including any over-the-counter vitamins and herbal supplements to your Readiness Visit, and on the day of surgery.    DO NOT bring your medications with you the day of surgery.    Please see attached third sheet for more details on medications/vitamins/herbal supplements that should be discontinued prior to your surgery date.     If you are unsure if you should discontinue a medication/ vitamin/herbal supplement, please call our office and discuss with a nurse.    Continue taking your medications/vitamins/herbal supplements unless they are on the attached list.     Failure to follow the instructions regarding medications/vitamins/herbal supplements will result in cancellation of your surgery.    Day BEFORE Surgery    DO NOT shave near your surgical site. This can cause irritation of the skin    Using a washcloth and provided bottle of Hibiclens, shower the night BEFORE surgery, using a half bottle of Hibiclens to wash your body, avoiding face and genitals. The morning OF surgery, shower and use the second half of the bottle to wash your body, avoiding face and genitals. If you are unable to take a shower the morning of surgery, please discuss your options with the nurse at your readiness visit.     NOTHING  to eat after 11:00 p.m. the night  prior to your procedure    CLEAR LIQUIDS: May have the following liquids up to two (2) hours before your arrival time at the hospital: water, plain black coffee (no cream or milk), plain black tea or plain green tea (no cream or milk), Gatorade or Propel Water.    SMOKING: Stop smoking as far before surgery as possible, or as directed by your surgeon. NO tobacco products of any kind (cigarettes, e-cigarettes, chewing tobacco) beginning at 11:00 p.m. the night prior to your procedure.     ALCOHOL: You should stop drinking alcohol beginning at 11:00 p.m. the night prior to your procedure    Contact our office if you have symptoms of illness such as a fever of 101 or greater, chills, cough, sore throat, or if you develop a rash or any open sore    Day OF Surgery    If youve been instructed to take a medication(s) on the morning of surgery, please take with a very small sip of water.    Wear loose & comfortable clothing and flat shoes, Leave jewelry/valuables at home. If you wear contact lenses, remove them at home and wear glasses. Remove any body piercings. Remove nail polish.     Planning for Discharge    Start planning for your care after discharge as soon as you receive this letter.    If you have not made arrangements to have someone take you home and stay with you for the first 48 hours after discharge, your surgery will be cancelled.      PRE-OPERATIVE MEDICATION INSTRUCTIONS  Review this information with your primary care physician prior to discontinuing any of the medications listed below.  Notify your primary care physician that you have been instructed to discontinue these medications.    TEN (10) Days Prior to Surgery, STOP the Following Medications   [place]  Anacin  Aspirin  Excedrin  Pepto Bismol    **Before taking ANY over-the-counter medications, check the label for Aspirin   Non-steroidal   Anti-inflammatory Medications (NSAIDS)    Celebrex  Diclofenac (Cataflam)  Etodolac (Iodine)  Fenoprofen  (Nalfon)  Ibuprofen (Advil, Motrin, Nuprin)  Indomethacin (Indocin)  Ketoprofen  Ketorolac (Toradol)  Melaxicam (Mobic)  Naproxen (AnaProx, Aleve, Naprosyn)  Relafen (Nabumetone)   Herbal Supplements (this is a partial list of herbals to be discontinued)    Yuniel Boyce Quai  Ephedra  Feverfew  Fish Oil  Flaxseed Oil  Garlic  Sarah  Gingko  Ginseng  Goldenseal  Imitrex (Sumatriptan)  Kava  Krill Oil  Licorice  Multi Vitamins  [facility]  Valerian  Vitamin E  Yohimbe   CHECK WITH YOUR PRESCRIBING DOCTOR BEFORE STOPPING ANY BLOOD THINNERS (approximately 7 days prior to surgery)  (Coumadin, Plavix, Platel, Aggrenox, Effient (Prasugrel), Ticlid), Xarelto, and Pradaxa      ALWAYS CHECK WITH YOUR PRESCRIBING DOCTOR REGARDING THE MEDICATIONS LISTED BELOW; RECOMMENDED STOP TIME IS ALSO LISTED      If you are taking Lovenox, discontinue 24 HOURS prior to surgery    If you are taking weight loss medication, discontinue 7 days prior to surgery    If you are taking Metformin or Simvastatin, check with your primary care physician (or whoever has prescribed you this medication) regarding when to discontinue prior to surgery        and traditional parking is located at [facility] at [address] [place] [address]. Validation is done at the Welcome Desk in the [facility].

## 2021-06-22 ENCOUNTER — VIRTUAL VISIT (OUTPATIENT)
Dept: PHARMACY | Facility: CLINIC | Age: 81
End: 2021-06-22
Payer: COMMERCIAL

## 2021-06-22 DIAGNOSIS — N40.1 LOWER URINARY TRACT SYMPTOMS DUE TO BENIGN PROSTATIC HYPERPLASIA: ICD-10-CM

## 2021-06-22 DIAGNOSIS — Z78.9 TAKES DIETARY SUPPLEMENTS: ICD-10-CM

## 2021-06-22 DIAGNOSIS — G20.A1 PARKINSON DISEASE (H): Primary | ICD-10-CM

## 2021-06-22 DIAGNOSIS — K59.00 CONSTIPATION, UNSPECIFIED CONSTIPATION TYPE: ICD-10-CM

## 2021-06-22 PROCEDURE — 99607 MTMS BY PHARM ADDL 15 MIN: CPT | Performed by: PHARMACIST

## 2021-06-22 PROCEDURE — 99605 MTMS BY PHARM NP 15 MIN: CPT | Performed by: PHARMACIST

## 2021-06-22 RX ORDER — AMANTADINE HYDROCHLORIDE 100 MG/1
CAPSULE, GELATIN COATED ORAL
Qty: 60 CAPSULE | Refills: 11 | Status: SHIPPED | OUTPATIENT
Start: 2021-06-22 | End: 2021-01-01

## 2021-06-22 SDOH — HEALTH STABILITY: MENTAL HEALTH: HOW OFTEN DO YOU HAVE 6 OR MORE DRINKS ON ONE OCCASION?: NOT ASKED

## 2021-06-22 SDOH — HEALTH STABILITY: MENTAL HEALTH: HOW MANY STANDARD DRINKS CONTAINING ALCOHOL DO YOU HAVE ON A TYPICAL DAY?: NOT ASKED

## 2021-06-22 SDOH — HEALTH STABILITY: MENTAL HEALTH: HOW OFTEN DO YOU HAVE A DRINK CONTAINING ALCOHOL?: NOT ASKED

## 2021-06-22 NOTE — PATIENT INSTRUCTIONS
Recommendations from today's MTM visit:                                                    MTM (medication therapy management) is a service provided by a clinical pharmacist designed to help you get the most of out of your medicines.      1. Trial of amantadine 100 mg: Take 1 capsule by mouth daily in the morning for 1 week, then increase to 1 capsule twice daily (morning and early afternoon) thereafter    2. Possible side effects can include sleep disturbance, swelling in extremities, discoloration of skin, and cognitive impairment/fogginess. Please let us know if any of these side effects occur.    Follow-up: Return in 5 weeks (on 7/27/2021) for Medication Therapy Management (telephone visit at 2:30 pm).    It was great to speak with you today.  I value your experience and would be very thankful for your time with providing feedback on our clinic survey. You may receive a survey via email or text message in the next few days.     To schedule another MTM appointment, please call the clinic directly or you may call the MTM scheduling line at [phone] or toll-free at [phone].     My Clinical Pharmacist's contact information:                                                      Please feel free to contact me with any questions or concerns you have.      Erma Orellana, Pharm.D.  Medication Therapy Management Pharmacist  Phone: [phone]

## 2021-06-22 NOTE — PROGRESS NOTES
"Medication Therapy Management (MTM) Encounter    ASSESSMENT:                            Medication Adherence/Access: No issues identified    Parkinson's Disease:  Dr. Toney had suggested a possible trial of amantadine for Parkinsonism and after discussing the possible benefits and risks, patient opted to try the medication. Renal function appears to be normal so would start with 100 mg amantadine and increase to 200 mg/day if needed.    BPH: agree with stopping tamsulosin     Constipation: stable    Vitamins/supplements: appears stable    PLAN:                            1. Trial of amantadine 100 mg: Take 1 capsule by mouth daily in the morning for 1 week, then increase to 1 capsule twice daily (morning and early afternoon) thereafter  2. Possible side effects can include sleep disturbance, swelling in extremities, discoloration of skin, and cognitive impairment/fogginess. Please let us know if any of these side effects occur.    Follow-up: Return in 5 weeks (on 7/27/2021) for Medication Therapy Management (telephone visit at 2:30 pm).      SUBJECTIVE/OBJECTIVE:                          Jacobo Sepulveda is a 80 year old male called for an initial visit. He was referred to me from Dr. Toney.      Reason for visit: discuss parkinsonism.    Allergies/ADRs: Reviewed in chart  Tobacco: He reports that he quit smoking about 46 years ago. He started smoking about 66 years ago. He has quit using smokeless tobacco.  Alcohol: 1 drink per day unless having a pain medication  Caffeine: very little - coffee smells terrible, nose runs with it   Activity: physical therapy exercises 45 min/day; wheelchair-bound  Past Medical History: Reviewed in chart    Medication Adherence/Access: no issues reported    Parkinson's Disease:  Not currently taking any medications. He tried carbidopa-levodopa on 2 occassions, 1 tablet each time - first occasion \"almost put me out\" and second occasion caused \"beating out of my chest\" within 30 " minutes of taking the dose. Patient states he is unable to walk, can't use his hands/arms and he has been told this may or may not be Parkinson's. He has had issues with severe neuropathy and is no longer taking gabapentin. He has had carpal tunnel issues followed by severe swelling in his extremities that lingered for 1.5 years.  He has been in physical therapy and does exercises but still cannot walk. Would be open to trying a different medication if it might help him walk.    BPH: taking tamsulosin 0.4 mg daily, recent decrease from twice daily. He is planning to taper off completely has his nocturia issues are no longer a concern while he is wearing diapers and is incontinent.     Constipation: Taking docusate 100 mg daily and Miralax as needed, no issues reported.    Vitamins/supplements: Taking vitamin D3 1000 units twice daily, fish oil 5 times/day and vitamin B12 100 mcg every other day. Recently decreased B12 dose as B12 level was elevated in April 2021. Would like to continue taking these supplements.     Today's Vitals: There were no vitals taken for this visit.     BP Readings from Last 1 Encounters:   06/10/21 (!) 150/76     Pulse Readings from Last 1 Encounters:   06/10/21 74     ----------------    I spent 28 minutes with this patient today. All changes were made via collaborative practice agreement with Dr. Toney. A copy of the visit note was provided to the patient's referring provider.    The patient was sent via [employer] a summary of these recommendations.     Erma Orellana, Pharm.D.  Medication Therapy Management Pharmacist  Phone: [phone]    Telemedicine Visit Details  Type of service:  Telephone visit  Start Time: 2:32 PM  End Time: 3:00 PM  Originating Location (patient location): Home  Distant Location (provider location):  [facility]      Medication Therapy Recommendations  Parkinson disease (H)    Current Medication: amantadine (SYMMETREL) 100 MG capsule    Rationale: Untreated condition - Needs additional medication therapy - Indication   Recommendation: Start Medication - amantadine HCl 100 MG Tabs   Status: Accepted per CPA

## 2021-07-27 NOTE — PROGRESS NOTES
Medication Therapy Management (MTM) Encounter    ASSESSMENT:                            Medication Adherence/Access: No issues identified    Parkinson's Disease:  Patient not interested in taking any other medications, which I think is reasonable given uncontrolled orthostatic hypotension. Encouraged patient to follow up with myself or Dr. Toney if he changes his mind.     Orthostatic hypotension: We discussed lifestyle modifications for orthostatic hypotension and I advised that he see cardiology but patient/wife declined      BPH: advised patient to stop tamsulosin today as this medication can aggravate low blood pressure and the medication does not need to be tapered    PLAN:                            1. You may stop the tamsulosin completely today; it does not need to be tapered  2. You decided to not try the amantadine (Parkinson's disease medication) which is okay with us and if you change your mind, please contact our clinic  3. Consider seeing cardiology for assistance with managing low blood pressure issues     Follow-up: Return in about 1 year (around 7/27/2022) for Medication Therapy Management.      SUBJECTIVE/OBJECTIVE:                          Jacobo Sepulveda is a 81 year old male called for a follow-up visit. He was referred to me from Dr. Toney. Patient was accompanied by wife. Today's visit is a follow-up MTM visit from 6/22/21     Reason for visit: follow up on Parkinson's disease medications.    Allergies/ADRs: Reviewed in chart  Past Medical History: Reviewed in chart  Tobacco: He reports that he quit smoking about 46 years ago. He started smoking about 66 years ago. He has quit using smokeless tobacco.  Alcohol: 1 drink per day unless having a pain medication    Medication Adherence/Access: no issues reported    Parkinson's Disease:  Not currently taking any medications. He has tried carbidopa-levodopa on 2 occassions and it caused him dizziness and near-syncope. He is not interested in trying  "amantadine, which was prescribed at our last visit. Reports his blood pressure issues and neck pain are his main concerns right now. He states his speech has not been as clear lately but cognitively feels he is doing well.      Orthostatic hypotension: This has not been diagnosed formally and he has not taken any medications to increase his blood pressure. He is not willing to see cardiology as it is too difficult for him to ambulate, yet when offered a virtual visit the patient's wife stated she is fed up with providers not actually touching patients. He is currently measuring blood pressure 3 times/day and notices if his systolic blood pressure is not over 100 he may pass out. His neck brace seems to help prevent him from passing out sometimes. His systolic blood pressure ranges from 80 to 130 typically. Transferring positions and bowel movements seem to trigger syncope. He reportedly is drinking \"plenty of water\" between coffee, Metamucil, and plain water. When he has the syncopal episodes he is still \"with it\" and can hear what people are saying and he is breathing, per wife. I again suggested he see a provider about this and they stated they are not going to bring him in to be assessed for these episodes at this time. Today he is having a \"good day\" and his systolic blood pressure has been 120.      BPH: Taking tamsulosin 0.4 mg every other day. He is weaning off the medication currently.     Today's Vitals: There were no vitals taken for this visit.  ----------------    I spent 15 minutes with this patient today. All changes were made via collaborative practice agreement with Dr. Toney. A copy of the visit note was provided to the patient's referring provider.    The patient was sent via [employer] a summary of these recommendations.     Erma Orellana, Pharm.D.  Medication Therapy Management Pharmacist  Phone: [phone]    Telemedicine Visit Details  Type of service:  Telephone visit  Start Time: 2:31 PM  End " Time: 2:46 PM  Originating Location (patient location): Home  Distant Location (provider location):  [facility]     Medication Therapy Recommendations  Lower urinary tract symptoms due to benign prostatic hyperplasia    Current Medication: tamsulosin (FLOMAX) 0.4 MG capsule (Discontinued)   Rationale: Does not understand instructions - Adherence - Adherence   Recommendation: Provide Education - tamsulosin 0.4 MG capsule - advised to stop, not taper, medication   Status: Patient Agreed - Adherence/Education

## 2021-07-28 NOTE — PATIENT INSTRUCTIONS
Recommendations from today's MTM visit:                                                      1. You may stop the tamsulosin completely today; it does not need to be tapered  2. You decided to not try the amantadine (Parkinson's disease medication) which is okay with us and if you change your mind, please contact our clinic  3. Consider seeing cardiology for assistance with managing low blood pressure issues     Follow-up: Return in about 1 year (around 7/27/2022) for Medication Therapy Management.    It was great to speak with you today.  I value your experience and would be very thankful for your time with providing feedback on our clinic survey. You may receive a survey via email or text message in the next few days.     To schedule another MTM appointment, please call the clinic directly or you may call the MTM scheduling line at [phone] or toll-free at [phone].     My Clinical Pharmacist's contact information:                                                      Please feel free to contact me with any questions or concerns you have.      Erma Orellana, Pharm.D.  Medication Therapy Management Pharmacist  Phone: [phone]

## 2021-08-02 NOTE — TELEPHONE ENCOUNTER
8/2/2021    2nd attempt to schedule EMG, spoke with family member she states  patient is unable to make any appointments anymore .       Luci Mcdonald Procedure   Neurology & Neurosurgery Specialties  [facility]   [phone]

## 2021-12-16 NOTE — DISCHARGE INSTRUCTIONS
Deluna Catheter Care     A Deluna catheter is a rubber tube that is placed through the urethra and into the bladder. The urethra is the opening where urine comes out. The catheter helps drain urine from the bladder. There is a small balloon on the end of the tube that is inflated after the catheter is put in place. This keeps the catheter from sliding out of the bladder.  A Deluna catheter is used when you are unable to pass urine (urinary retention). It's also used when there is loss of bladder control (incontinence).  Home care    Finish taking any prescribed antibiotic medicine even if you are feeling better before then.    It's important to keep bacteria from getting into the collection bag. Don't disconnect the catheter from the collection bag.    Use a leg band to secure the drainage tube, so it doesn't pull on the catheter. Drain the collection bag when it becomes full using the drain spout at the bottom of the bag.    Don't try to pull or remove your catheter. This will injure your urethra. It must be removed by your healthcare provider or nurse.    Follow-up care  Follow up with your healthcare provider, or as advised. This is for repeat urine testing and for catheter removal or replacement.  When to seek medical advice  Call your healthcare provider right away if any of these occur:    Fever of 100.4 F (38 C) or higher, or as directed by your healthcare provider    Bladder pain or fullness    Abdominal swelling, nausea or vomiting, or back pain    Blood or urine leakage around the catheter    Bloody urine coming from the catheter (if a new symptom)    Catheter falls out    Catheter stops draining for 6 hours    Weakness, dizziness, or fainting  Teja last reviewed this educational content on 9/1/2019 2000-2021 [employer]. All rights reserved. This information is not intended as a substitute for professional medical care. Always follow your healthcare professional's  instructions.          Urinary Retention (Male)  Urinary retention is when you have trouble urinating. In some cases you may not be able to pass any urine at all. This condition occurs even though your bladder is full.   Causes  The most common cause of urinary retention in men is the bladder outlet being blocked. This can be due to an enlarged prostate gland or a bladder infection. Some medicines can also cause this problem. This condition is more likely to occur as men get older.   Symptoms  Common symptoms include:    Pain (not everyone has this)    Frequent urination    Feeling that the bladder is still full after urinating    Not being able to control the release of urine (incontinence)    Swollen belly (abdomen)  Treatment  This condition is treated by putting a tube (catheter) into the bladder to drain the urine. This gives relief right away. The catheter may need to stay in place for a few days. The catheter has a balloon on the tip. This is inflated after the catheter is put in the bladder. This prevents the catheter from falling out.     Home care    If you were given antibiotics, take them until they are used up, or your healthcare provider tells you to stop. It's important to finish the antibiotics even if you feel better. This is to make sure your infection has cleared.    If a catheter was left in place, it's important to keep bacteria from getting into the collection bag. Don't disconnect the catheter from the collection bag.    Use a leg band to secure the drainage tube, so it does not pull on the catheter. Drain the collection bag when it becomes full using the drain spout at the bottom of the bag.    Don't pull on or try to take out your catheter. This will harm your urethra. The catheter must be removed by a healthcare provider.    Follow-up care  Follow up with your healthcare provider, or as advised.  If a catheter was left in place, it can often be removed in 3 to 7 days. Some conditions  require the catheter to stay in longer. Your provider will tell you when to come back to have the catheter removed.   When to get medical advice  Call your healthcare provider right away if any of these occur:    Fever of 100.4 F (38 C) or higher, or as directed by your provider    Bladder or lower-belly pain or fullness    Belly swelling, nausea, vomiting, or back pain    Blood or urine leakage around the catheter    Bloody urine coming from the catheter (if a new symptom)    Weakness, dizziness, or fainting    Confusion or change in normal level of alertness    If a catheter was left in place, see your provider if the catheter:  ? Falls out  ? Stops draining for 6 hours  [employer] last reviewed this educational content on 1/1/2020 2000-2021 [employer]. All rights reserved. This information is not intended as a substitute for professional medical care. Always follow your healthcare professional's instructions.      You were found to have urinary retention today.  A catheter was placed.  Keep this in place until you follow-up with urology.  Call their office to make an appointment.  Return to the ER if you have any worsening symptoms or other concerns.

## 2021-12-16 NOTE — CONSULTS
"Care Management Initial Consult    General Information  Assessment completed with: Patient,Children, Bill and son Dave  Type of CM/SW Visit: Initial Assessment    Primary Care Provider verified and updated as needed: Yes   Readmission within the last 30 days: no previous admission in last 30 days      Reason for Consult: discharge planning  Advance Care Planning:            Communication Assessment  Patient's communication style: spoken language (English or Bilingual)             Cognitive  Cognitive/Neuro/Behavioral: .WDL except (pt reports slow movements at baseline and unable to ambulate without extensive 2x assist.)  Level of Consciousness: alert  Arousal Level: opens eyes spontaneously  Orientation: oriented x 4  Mood/Behavior: calm,cooperative          Living Environment:   People in home: spouse,child(yessenia), adult  Dave Fisher  Current living Arrangements: house      Able to return to prior arrangements: yes       Family/Social Support:  Care provided by: child(yessenia) (\"son is primary care giver, wife also helps\")  Provides care for: no one, unable/limited ability to care for self  Marital Status:   Wife  Natalia       Description of Support System: Supportive,Involved    Support Assessment: Adequate family and caregiver support,Adequate social supports,Patient communicates needs well met    Current Resources:   Patient receiving home care services: No     Community Resources: None  Equipment currently used at home: wheelchair, manual  Supplies currently used at home: Other (going home with a new indwelling salinas catheter)    Employment/Financial:  Employment Status: retired     Employment/ Comments: \"I could use some  benefits, but I don't want anything to do with them\".  Financial Concerns:     Referral to Financial Counselor: No       Lifestyle & Psychosocial Needs:  Social Determinants of Health     Tobacco Use: Medium Risk     Smoking Tobacco Use: Former Smoker     Smokeless Tobacco " "Use: Former User   Alcohol Use: Not on file   Financial Resource Strain: Not on file   Food Insecurity: Not on file   Transportation Needs: Not on file   Physical Activity: Not on file   Stress: Not on file   Social Connections: Not on file   Intimate Partner Violence: Not on file   Depression: Not at risk     PHQ-2 Score: 0   Housing Stability: Not on file       Functional Status:  Prior to admission patient needed assistance:   Dependent ADLs:: Wheelchair-with assist,Bathing,Dressing,Eating,Grooming,Incontinence,Positioning,Transfers,Toileting  Dependent IADLs:: Cleaning,Cooking,Laundry,Shopping,Meal Preparation,Medication Management,Money Management,Transportation,Incontinence  Assesssment of Functional Status: At functional baseline    Mental Health Status:          Chemical Dependency Status:                Values/Beliefs:  Spiritual, Cultural Beliefs, Jew Practices, Values that affect care:                 Additional Information:  I was asked to see this patient since Dr. Portillo had heard family express concern about the catheter at home and getting to any follow up appointments.    Jossue lives in a house with his wife and son. They are both his primary caregivers. He is bed or wheelchair bound. He needs assistance with all transfers. He is total cares at baseline.    He is going home with a new indwelling salinas catheter. Also Dr. Portillo called the urologist and found that he will need to follow up with them. They will not be able to \"just have home care see them in the house\".    I spoke with Jossue and the son Dave about these needs. Dave states, \"Right now I am able to transfer my father and do all his cares. We do not need any home care help for any of that at this time. I would like a home care nurse to care for the catheter.\" We spoke about catheter cares and after I spoke the ER RN is also going to do catheter care education with both of them. Dave is aware now \"that this is something he can do at " "home without home care help\". I did give them information for future home care companies to choose from.    I also spoke to them about transportation concerns. They had originally told the MD that getting him into a car and going to an appointment is not possible. And when I spoke to them they stated, \"it is not easy to go to an appointment, but if it is necessary we can get him into a car and to an appointment.\"    Sabi Strange RN      "

## 2021-12-16 NOTE — ED NOTES
Bed: JNED-04  Expected date: 12/16/21  Expected time:   Means of arrival: Ambulance  Comments:  [facility]  81 M  Abd pain
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
## 2022-01-01 ENCOUNTER — DOCUMENTATION ONLY (OUTPATIENT)
Dept: OTHER | Facility: CLINIC | Age: 82
End: 2022-01-01
Payer: MEDICARE

## 2022-01-01 ENCOUNTER — TELEPHONE (OUTPATIENT)
Dept: NEUROLOGY | Facility: CLINIC | Age: 82
End: 2022-01-01
Payer: MEDICARE

## 2022-01-01 ENCOUNTER — HEALTH MAINTENANCE LETTER (OUTPATIENT)
Age: 82
End: 2022-01-01

## 2022-02-01 NOTE — TELEPHONE ENCOUNTER
[facility] Call Center    Phone Message    May a detailed message be left on voicemail: yes     Reason for Call: Other: Francia from [facility] needs to know which type of catheter to use. Please call her back with that information.    Action Taken: Message routed to:  Other: neurology    Travel Screening: Not Applicable

## 2022-02-02 NOTE — TELEPHONE ENCOUNTER
Jossue jones's Dr. Giuliano Kim at [facility] Phone: [phone]. Left a message for Francia informing her to call Dr. Kim's office for this information.

## 2024-05-29 NOTE — TELEPHONE ENCOUNTER
Detail Level: [facility] Call [facility]    Phone Message    May a detailed message be left on voicemail: yes     Reason for Call: Order(s): Home Care Orders: Skilled Nursing:  Verbal orders for Catheter change once a month, please call Francia at [phone].    Action Taken: Message routed to:  [facility]    Travel Screening: Not Applicable